# Patient Record
Sex: MALE | Race: WHITE | NOT HISPANIC OR LATINO | Employment: FULL TIME | URBAN - METROPOLITAN AREA
[De-identification: names, ages, dates, MRNs, and addresses within clinical notes are randomized per-mention and may not be internally consistent; named-entity substitution may affect disease eponyms.]

---

## 2017-11-09 ENCOUNTER — HOSPITAL ENCOUNTER (INPATIENT)
Facility: OTHER | Age: 47
LOS: 3 days | Discharge: HOME | DRG: 854 | End: 2017-11-12
Attending: EMERGENCY MEDICINE | Admitting: HOSPITALIST
Payer: MEDICAID

## 2017-11-09 DIAGNOSIS — L02.512 ABSCESS OF LEFT HAND: Primary | ICD-10-CM

## 2017-11-09 DIAGNOSIS — M25.569 KNEE PAIN: ICD-10-CM

## 2017-11-09 DIAGNOSIS — A41.9 SEPSIS: ICD-10-CM

## 2017-11-09 DIAGNOSIS — R01.1 HEART MURMUR: ICD-10-CM

## 2017-11-09 DIAGNOSIS — F19.90 IVDU (INTRAVENOUS DRUG USER): ICD-10-CM

## 2017-11-09 DIAGNOSIS — M13.0 POLYARTHRITIS: ICD-10-CM

## 2017-11-09 DIAGNOSIS — R00.0 TACHYCARDIA: ICD-10-CM

## 2017-11-09 DIAGNOSIS — A41.9 SEPSIS, DUE TO UNSPECIFIED ORGANISM: ICD-10-CM

## 2017-11-09 PROBLEM — M17.11 ARTHRITIS OF RIGHT KNEE: Status: ACTIVE | Noted: 2017-11-09

## 2017-11-09 LAB
ALBUMIN SERPL BCP-MCNC: 3.8 G/DL
ALP SERPL-CCNC: 75 U/L
ALT SERPL W/O P-5'-P-CCNC: 72 U/L
AMPHET+METHAMPHET UR QL: NORMAL
ANION GAP SERPL CALC-SCNC: 14 MMOL/L
AST SERPL-CCNC: 67 U/L
BARBITURATES UR QL SCN>200 NG/ML: NEGATIVE
BASOPHILS # BLD AUTO: 0.03 K/UL
BASOPHILS NFR BLD: 0.2 %
BENZODIAZ UR QL SCN>200 NG/ML: NEGATIVE
BILIRUB SERPL-MCNC: 1.3 MG/DL
BILIRUB UR QL STRIP: NEGATIVE
BNP SERPL-MCNC: <10 PG/ML
BUN SERPL-MCNC: 12 MG/DL
BZE UR QL SCN: NEGATIVE
CALCIUM SERPL-MCNC: 10 MG/DL
CANNABINOIDS UR QL SCN: NEGATIVE
CHLORIDE SERPL-SCNC: 95 MMOL/L
CLARITY UR: CLEAR
CO2 SERPL-SCNC: 21 MMOL/L
COLOR UR: YELLOW
CREAT SERPL-MCNC: 1 MG/DL
CREAT UR-MCNC: 100.7 MG/DL
CRP SERPL-MCNC: 34.5 MG/L
DIFFERENTIAL METHOD: ABNORMAL
EOSINOPHIL # BLD AUTO: 0.1 K/UL
EOSINOPHIL NFR BLD: 0.5 %
ERYTHROCYTE [DISTWIDTH] IN BLOOD BY AUTOMATED COUNT: 12.6 %
ERYTHROCYTE [SEDIMENTATION RATE] IN BLOOD BY WESTERGREN METHOD: 5 MM/HR
EST. GFR  (AFRICAN AMERICAN): >60 ML/MIN/1.73 M^2
EST. GFR  (NON AFRICAN AMERICAN): >60 ML/MIN/1.73 M^2
GLUCOSE SERPL-MCNC: 83 MG/DL
GLUCOSE UR QL STRIP: NEGATIVE
HCT VFR BLD AUTO: 46.8 %
HGB BLD-MCNC: 16.5 G/DL
HGB UR QL STRIP: NEGATIVE
KETONES UR QL STRIP: ABNORMAL
LACTATE SERPL-SCNC: 0.6 MMOL/L
LACTATE SERPL-SCNC: 1.1 MMOL/L
LEUKOCYTE ESTERASE UR QL STRIP: NEGATIVE
LYMPHOCYTES # BLD AUTO: 1 K/UL
LYMPHOCYTES NFR BLD: 6.6 %
MCH RBC QN AUTO: 30.4 PG
MCHC RBC AUTO-ENTMCNC: 35.3 G/DL
MCV RBC AUTO: 86 FL
METHADONE UR QL SCN>300 NG/ML: NEGATIVE
MONOCYTES # BLD AUTO: 1 K/UL
MONOCYTES NFR BLD: 6.9 %
NEUTROPHILS # BLD AUTO: 12.5 K/UL
NEUTROPHILS NFR BLD: 85.5 %
NITRITE UR QL STRIP: NEGATIVE
OPIATES UR QL SCN: NORMAL
PCP UR QL SCN>25 NG/ML: NEGATIVE
PH UR STRIP: 6 [PH] (ref 5–8)
PLATELET # BLD AUTO: 281 K/UL
PMV BLD AUTO: 9.3 FL
POCT GLUCOSE: 78 MG/DL (ref 70–110)
POTASSIUM SERPL-SCNC: 4.3 MMOL/L
PROT SERPL-MCNC: 8.4 G/DL
PROT UR QL STRIP: NEGATIVE
RBC # BLD AUTO: 5.42 M/UL
SODIUM SERPL-SCNC: 130 MMOL/L
SP GR UR STRIP: 1.02 (ref 1–1.03)
TOXICOLOGY INFORMATION: NORMAL
URATE SERPL-MCNC: 7.2 MG/DL
URN SPEC COLLECT METH UR: ABNORMAL
UROBILINOGEN UR STRIP-ACNC: NEGATIVE EU/DL
WBC # BLD AUTO: 14.58 K/UL

## 2017-11-09 PROCEDURE — 86140 C-REACTIVE PROTEIN: CPT

## 2017-11-09 PROCEDURE — 36415 COLL VENOUS BLD VENIPUNCTURE: CPT

## 2017-11-09 PROCEDURE — 83605 ASSAY OF LACTIC ACID: CPT

## 2017-11-09 PROCEDURE — 96365 THER/PROPH/DIAG IV INF INIT: CPT

## 2017-11-09 PROCEDURE — 96375 TX/PRO/DX INJ NEW DRUG ADDON: CPT

## 2017-11-09 PROCEDURE — 93005 ELECTROCARDIOGRAM TRACING: CPT

## 2017-11-09 PROCEDURE — 63600175 PHARM REV CODE 636 W HCPCS: Performed by: HOSPITALIST

## 2017-11-09 PROCEDURE — 25000003 PHARM REV CODE 250: Performed by: PHYSICIAN ASSISTANT

## 2017-11-09 PROCEDURE — 93010 ELECTROCARDIOGRAM REPORT: CPT | Mod: ,,, | Performed by: INTERNAL MEDICINE

## 2017-11-09 PROCEDURE — 81003 URINALYSIS AUTO W/O SCOPE: CPT

## 2017-11-09 PROCEDURE — 85651 RBC SED RATE NONAUTOMATED: CPT

## 2017-11-09 PROCEDURE — 63600175 PHARM REV CODE 636 W HCPCS: Performed by: PHYSICIAN ASSISTANT

## 2017-11-09 PROCEDURE — 87591 N.GONORRHOEAE DNA AMP PROB: CPT

## 2017-11-09 PROCEDURE — 87040 BLOOD CULTURE FOR BACTERIA: CPT

## 2017-11-09 PROCEDURE — 96361 HYDRATE IV INFUSION ADD-ON: CPT

## 2017-11-09 PROCEDURE — 80307 DRUG TEST PRSMV CHEM ANLYZR: CPT

## 2017-11-09 PROCEDURE — 11000001 HC ACUTE MED/SURG PRIVATE ROOM

## 2017-11-09 PROCEDURE — 96366 THER/PROPH/DIAG IV INF ADDON: CPT

## 2017-11-09 PROCEDURE — 99223 1ST HOSP IP/OBS HIGH 75: CPT | Mod: ,,, | Performed by: HOSPITALIST

## 2017-11-09 PROCEDURE — 83880 ASSAY OF NATRIURETIC PEPTIDE: CPT

## 2017-11-09 PROCEDURE — 80053 COMPREHEN METABOLIC PANEL: CPT

## 2017-11-09 PROCEDURE — 84550 ASSAY OF BLOOD/URIC ACID: CPT

## 2017-11-09 PROCEDURE — 99285 EMERGENCY DEPT VISIT HI MDM: CPT | Mod: 25

## 2017-11-09 PROCEDURE — 87086 URINE CULTURE/COLONY COUNT: CPT

## 2017-11-09 PROCEDURE — 82962 GLUCOSE BLOOD TEST: CPT

## 2017-11-09 PROCEDURE — 85025 COMPLETE CBC W/AUTO DIFF WBC: CPT

## 2017-11-09 PROCEDURE — 83605 ASSAY OF LACTIC ACID: CPT | Mod: 91

## 2017-11-09 RX ORDER — MORPHINE SULFATE 2 MG/ML
6 INJECTION, SOLUTION INTRAMUSCULAR; INTRAVENOUS
Status: COMPLETED | OUTPATIENT
Start: 2017-11-09 | End: 2017-11-09

## 2017-11-09 RX ORDER — ZOLPIDEM TARTRATE 5 MG/1
5 TABLET ORAL NIGHTLY PRN
Status: DISCONTINUED | OUTPATIENT
Start: 2017-11-09 | End: 2017-11-12 | Stop reason: HOSPADM

## 2017-11-09 RX ORDER — ENOXAPARIN SODIUM 100 MG/ML
40 INJECTION SUBCUTANEOUS EVERY 24 HOURS
Status: DISCONTINUED | OUTPATIENT
Start: 2017-11-09 | End: 2017-11-12 | Stop reason: HOSPADM

## 2017-11-09 RX ORDER — ACETAMINOPHEN 500 MG
1000 TABLET ORAL
Status: COMPLETED | OUTPATIENT
Start: 2017-11-09 | End: 2017-11-09

## 2017-11-09 RX ORDER — SODIUM CHLORIDE 0.9 % (FLUSH) 0.9 %
5 SYRINGE (ML) INJECTION
Status: DISCONTINUED | OUTPATIENT
Start: 2017-11-09 | End: 2017-11-12 | Stop reason: HOSPADM

## 2017-11-09 RX ADMIN — ACETAMINOPHEN 1000 MG: 500 TABLET ORAL at 02:11

## 2017-11-09 RX ADMIN — SODIUM CHLORIDE 2586 ML: 0.9 INJECTION, SOLUTION INTRAVENOUS at 11:11

## 2017-11-09 RX ADMIN — MORPHINE SULFATE 6 MG: 2 INJECTION, SOLUTION INTRAMUSCULAR; INTRAVENOUS at 12:11

## 2017-11-09 RX ADMIN — ENOXAPARIN SODIUM 40 MG: 100 INJECTION SUBCUTANEOUS at 08:11

## 2017-11-09 RX ADMIN — VANCOMYCIN HYDROCHLORIDE 1000 MG: 1 INJECTION, POWDER, LYOPHILIZED, FOR SOLUTION INTRAVENOUS at 11:11

## 2017-11-09 NOTE — ED TRIAGE NOTES
Pt reports waking up with severe pain to R knee and to L hand. L hand middle joint swelling noted with red streaks up hand. Pt denies any drug use, supplement use, or hx of gout. Pt reports R knee is to painful to bend. Denies any injury/trauma

## 2017-11-09 NOTE — ED NOTES
Urinal at bedside. Pt instructed on need for urine. Pt verbalized understanding. Redness, swelling noted to 4th knuckle left hand with red streaks to left wrist/forearm and warm to touch. Pt reports IV drug use approx 5-6 days ago. Pt reporting 9/10 pain.

## 2017-11-09 NOTE — ED NOTES
Rounding completed on pt. Pt reporting tolerable 7/10 pain, denies any need for pain medication at this time. Bed in lowest, locked position, side rails up x 2. Call light within reach.

## 2017-11-09 NOTE — ED NOTES
Hourly rounding completed on pt. Pt reporting 4/10 pain that only worsening with movement. Pt updated on plan of care. Bed in lowest, locked position, side rails up x 2. Call light within reach.
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
## 2017-11-09 NOTE — ED NOTES
Assumed care of pt from TONYA Vigil. Pt reporting left hand pain with redness and swelling x 2 days, also right knee pain with redness swelling. Pt unsure if bit by something. Pt does admit IV drug use, last used approx 5-6 days ago. Pt awake, alert, oriented and appears in NAD.

## 2017-11-10 ENCOUNTER — ANESTHESIA (OUTPATIENT)
Dept: SURGERY | Facility: OTHER | Age: 47
DRG: 854 | End: 2017-11-10
Payer: MEDICAID

## 2017-11-10 ENCOUNTER — ANESTHESIA EVENT (OUTPATIENT)
Dept: SURGERY | Facility: OTHER | Age: 47
DRG: 854 | End: 2017-11-10
Payer: MEDICAID

## 2017-11-10 PROBLEM — M25.569 KNEE PAIN: Status: ACTIVE | Noted: 2017-11-10

## 2017-11-10 PROBLEM — L02.415 ABSCESS OF RIGHT KNEE: Status: ACTIVE | Noted: 2017-11-09

## 2017-11-10 LAB
ANION GAP SERPL CALC-SCNC: 8 MMOL/L
BACTERIA UR CULT: NO GROWTH
BASOPHILS # BLD AUTO: 0.02 K/UL
BASOPHILS NFR BLD: 0.3 %
BUN SERPL-MCNC: 10 MG/DL
C TRACH DNA SPEC QL NAA+PROBE: NOT DETECTED
CALCIUM SERPL-MCNC: 8.6 MG/DL
CHLORIDE SERPL-SCNC: 101 MMOL/L
CO2 SERPL-SCNC: 25 MMOL/L
CREAT SERPL-MCNC: 0.8 MG/DL
CRP SERPL-MCNC: 60.9 MG/L
DIASTOLIC DYSFUNCTION: YES
DIFFERENTIAL METHOD: ABNORMAL
EOSINOPHIL # BLD AUTO: 0 K/UL
EOSINOPHIL NFR BLD: 0.6 %
ERYTHROCYTE [DISTWIDTH] IN BLOOD BY AUTOMATED COUNT: 12.8 %
ERYTHROCYTE [SEDIMENTATION RATE] IN BLOOD BY WESTERGREN METHOD: 30 MM/HR
EST. GFR  (AFRICAN AMERICAN): >60 ML/MIN/1.73 M^2
EST. GFR  (NON AFRICAN AMERICAN): >60 ML/MIN/1.73 M^2
ESTIMATED PA SYSTOLIC PRESSURE: 14.16
GLOBAL PERICARDIAL EFFUSION: ABNORMAL
GLUCOSE SERPL-MCNC: 103 MG/DL
HCT VFR BLD AUTO: 42.7 %
HGB BLD-MCNC: 14.7 G/DL
LACTATE SERPL-SCNC: 0.7 MMOL/L
LYMPHOCYTES # BLD AUTO: 0.7 K/UL
LYMPHOCYTES NFR BLD: 10.9 %
MAGNESIUM SERPL-MCNC: 2.2 MG/DL
MCH RBC QN AUTO: 30.2 PG
MCHC RBC AUTO-ENTMCNC: 34.4 G/DL
MCV RBC AUTO: 88 FL
MITRAL VALVE REGURGITATION: ABNORMAL
MONOCYTES # BLD AUTO: 1.5 K/UL
MONOCYTES NFR BLD: 22.7 %
N GONORRHOEA DNA SPEC QL NAA+PROBE: NOT DETECTED
NEUTROPHILS # BLD AUTO: 4.2 K/UL
NEUTROPHILS NFR BLD: 64.9 %
PHOSPHATE SERPL-MCNC: 2.4 MG/DL
PLATELET # BLD AUTO: 231 K/UL
PMV BLD AUTO: 9.3 FL
POTASSIUM SERPL-SCNC: 3.8 MMOL/L
RBC # BLD AUTO: 4.86 M/UL
RETIRED EF AND QEF - SEE NOTES: 65 (ref 55–65)
SODIUM SERPL-SCNC: 134 MMOL/L
TRICUSPID VALVE REGURGITATION: ABNORMAL
WBC # BLD AUTO: 6.42 K/UL

## 2017-11-10 PROCEDURE — 83735 ASSAY OF MAGNESIUM: CPT

## 2017-11-10 PROCEDURE — 99233 SBSQ HOSP IP/OBS HIGH 50: CPT | Mod: ,,, | Performed by: INTERNAL MEDICINE

## 2017-11-10 PROCEDURE — 25000003 PHARM REV CODE 250: Performed by: ANESTHESIOLOGY

## 2017-11-10 PROCEDURE — 25000003 PHARM REV CODE 250: Performed by: NURSE ANESTHETIST, CERTIFIED REGISTERED

## 2017-11-10 PROCEDURE — 99233 SBSQ HOSP IP/OBS HIGH 50: CPT | Mod: ,,, | Performed by: HOSPITALIST

## 2017-11-10 PROCEDURE — 63600175 PHARM REV CODE 636 W HCPCS: Performed by: ANESTHESIOLOGY

## 2017-11-10 PROCEDURE — 83605 ASSAY OF LACTIC ACID: CPT

## 2017-11-10 PROCEDURE — 99233 SBSQ HOSP IP/OBS HIGH 50: CPT | Mod: 57,25,, | Performed by: PLASTIC SURGERY

## 2017-11-10 PROCEDURE — 84100 ASSAY OF PHOSPHORUS: CPT

## 2017-11-10 PROCEDURE — 63600175 PHARM REV CODE 636 W HCPCS: Performed by: INTERNAL MEDICINE

## 2017-11-10 PROCEDURE — 85025 COMPLETE CBC W/AUTO DIFF WBC: CPT

## 2017-11-10 PROCEDURE — 80048 BASIC METABOLIC PNL TOTAL CA: CPT

## 2017-11-10 PROCEDURE — 93306 TTE W/DOPPLER COMPLETE: CPT

## 2017-11-10 PROCEDURE — 37000008 HC ANESTHESIA 1ST 15 MINUTES: Performed by: PLASTIC SURGERY

## 2017-11-10 PROCEDURE — 25000003 PHARM REV CODE 250: Performed by: HOSPITALIST

## 2017-11-10 PROCEDURE — 25000003 PHARM REV CODE 250: Performed by: PLASTIC SURGERY

## 2017-11-10 PROCEDURE — 94761 N-INVAS EAR/PLS OXIMETRY MLT: CPT

## 2017-11-10 PROCEDURE — 37000009 HC ANESTHESIA EA ADD 15 MINS: Performed by: PLASTIC SURGERY

## 2017-11-10 PROCEDURE — 63600175 PHARM REV CODE 636 W HCPCS: Performed by: NURSE ANESTHETIST, CERTIFIED REGISTERED

## 2017-11-10 PROCEDURE — 26460 INCISE HAND/FINGER TENDON: CPT | Mod: LT,,, | Performed by: PLASTIC SURGERY

## 2017-11-10 PROCEDURE — 87205 SMEAR GRAM STAIN: CPT

## 2017-11-10 PROCEDURE — 63600175 PHARM REV CODE 636 W HCPCS: Performed by: HOSPITALIST

## 2017-11-10 PROCEDURE — 36415 COLL VENOUS BLD VENIPUNCTURE: CPT

## 2017-11-10 PROCEDURE — 36000705 HC OR TIME LEV I EA ADD 15 MIN: Performed by: PLASTIC SURGERY

## 2017-11-10 PROCEDURE — 36000704 HC OR TIME LEV I 1ST 15 MIN: Performed by: PLASTIC SURGERY

## 2017-11-10 PROCEDURE — S0028 INJECTION, FAMOTIDINE, 20 MG: HCPCS | Performed by: NURSE ANESTHETIST, CERTIFIED REGISTERED

## 2017-11-10 PROCEDURE — 25000003 PHARM REV CODE 250: Performed by: NURSE PRACTITIONER

## 2017-11-10 PROCEDURE — 87075 CULTR BACTERIA EXCEPT BLOOD: CPT

## 2017-11-10 PROCEDURE — 11000001 HC ACUTE MED/SURG PRIVATE ROOM

## 2017-11-10 PROCEDURE — 85651 RBC SED RATE NONAUTOMATED: CPT

## 2017-11-10 PROCEDURE — 0LB80ZZ EXCISION OF LEFT HAND TENDON, OPEN APPROACH: ICD-10-PCS | Performed by: PLASTIC SURGERY

## 2017-11-10 PROCEDURE — 86140 C-REACTIVE PROTEIN: CPT

## 2017-11-10 PROCEDURE — 87070 CULTURE OTHR SPECIMN AEROBIC: CPT

## 2017-11-10 PROCEDURE — 26010 DRAINAGE OF FINGER ABSCESS: CPT | Mod: 51,F8,, | Performed by: PLASTIC SURGERY

## 2017-11-10 PROCEDURE — 71000033 HC RECOVERY, INTIAL HOUR: Performed by: PLASTIC SURGERY

## 2017-11-10 RX ORDER — DEXAMETHASONE SODIUM PHOSPHATE 4 MG/ML
INJECTION, SOLUTION INTRA-ARTICULAR; INTRALESIONAL; INTRAMUSCULAR; INTRAVENOUS; SOFT TISSUE
Status: DISCONTINUED | OUTPATIENT
Start: 2017-11-10 | End: 2017-11-10

## 2017-11-10 RX ORDER — SODIUM CHLORIDE 0.9 % (FLUSH) 0.9 %
3 SYRINGE (ML) INJECTION
Status: DISCONTINUED | OUTPATIENT
Start: 2017-11-10 | End: 2017-11-12 | Stop reason: HOSPADM

## 2017-11-10 RX ORDER — FENTANYL CITRATE 50 UG/ML
INJECTION, SOLUTION INTRAMUSCULAR; INTRAVENOUS
Status: DISCONTINUED | OUTPATIENT
Start: 2017-11-10 | End: 2017-11-10

## 2017-11-10 RX ORDER — LIDOCAINE HCL/PF 100 MG/5ML
SYRINGE (ML) INTRAVENOUS
Status: DISCONTINUED | OUTPATIENT
Start: 2017-11-10 | End: 2017-11-10

## 2017-11-10 RX ORDER — HYDROMORPHONE HYDROCHLORIDE 2 MG/ML
0.4 INJECTION, SOLUTION INTRAMUSCULAR; INTRAVENOUS; SUBCUTANEOUS EVERY 5 MIN PRN
Status: DISCONTINUED | OUTPATIENT
Start: 2017-11-10 | End: 2017-11-10 | Stop reason: HOSPADM

## 2017-11-10 RX ORDER — ONDANSETRON 2 MG/ML
INJECTION INTRAMUSCULAR; INTRAVENOUS
Status: DISCONTINUED | OUTPATIENT
Start: 2017-11-10 | End: 2017-11-10

## 2017-11-10 RX ORDER — MEPERIDINE HYDROCHLORIDE 50 MG/ML
12.5 INJECTION INTRAMUSCULAR; INTRAVENOUS; SUBCUTANEOUS ONCE AS NEEDED
Status: DISCONTINUED | OUTPATIENT
Start: 2017-11-10 | End: 2017-11-10 | Stop reason: HOSPADM

## 2017-11-10 RX ORDER — FENTANYL CITRATE 50 UG/ML
25 INJECTION, SOLUTION INTRAMUSCULAR; INTRAVENOUS EVERY 5 MIN PRN
Status: DISCONTINUED | OUTPATIENT
Start: 2017-11-10 | End: 2017-11-10 | Stop reason: HOSPADM

## 2017-11-10 RX ORDER — FENTANYL CITRATE 50 UG/ML
25 INJECTION, SOLUTION INTRAMUSCULAR; INTRAVENOUS EVERY 5 MIN PRN
Status: DISCONTINUED | OUTPATIENT
Start: 2017-11-10 | End: 2017-11-10 | Stop reason: SDUPTHER

## 2017-11-10 RX ORDER — ONDANSETRON 2 MG/ML
4 INJECTION INTRAMUSCULAR; INTRAVENOUS DAILY PRN
Status: DISCONTINUED | OUTPATIENT
Start: 2017-11-10 | End: 2017-11-10 | Stop reason: HOSPADM

## 2017-11-10 RX ORDER — BACITRACIN ZINC 500 UNIT/G
OINTMENT (GRAM) TOPICAL
Status: DISCONTINUED | OUTPATIENT
Start: 2017-11-10 | End: 2017-11-10 | Stop reason: HOSPADM

## 2017-11-10 RX ORDER — OXYCODONE HYDROCHLORIDE 5 MG/1
5 TABLET ORAL
Status: DISCONTINUED | OUTPATIENT
Start: 2017-11-10 | End: 2017-11-10 | Stop reason: SDUPTHER

## 2017-11-10 RX ORDER — FAMOTIDINE 10 MG/ML
INJECTION INTRAVENOUS
Status: COMPLETED
Start: 2017-11-10 | End: 2017-11-10

## 2017-11-10 RX ORDER — OXYCODONE AND ACETAMINOPHEN 5; 325 MG/1; MG/1
1 TABLET ORAL ONCE
Status: COMPLETED | OUTPATIENT
Start: 2017-11-10 | End: 2017-11-10

## 2017-11-10 RX ORDER — MEPERIDINE HYDROCHLORIDE 50 MG/ML
12.5 INJECTION INTRAMUSCULAR; INTRAVENOUS; SUBCUTANEOUS ONCE AS NEEDED
Status: DISCONTINUED | OUTPATIENT
Start: 2017-11-10 | End: 2017-11-10 | Stop reason: SDUPTHER

## 2017-11-10 RX ORDER — BACITRACIN 50000 [IU]/1
INJECTION, POWDER, FOR SOLUTION INTRAMUSCULAR
Status: DISCONTINUED | OUTPATIENT
Start: 2017-11-10 | End: 2017-11-10 | Stop reason: HOSPADM

## 2017-11-10 RX ORDER — SODIUM CHLORIDE, SODIUM LACTATE, POTASSIUM CHLORIDE, CALCIUM CHLORIDE 600; 310; 30; 20 MG/100ML; MG/100ML; MG/100ML; MG/100ML
INJECTION, SOLUTION INTRAVENOUS CONTINUOUS PRN
Status: DISCONTINUED | OUTPATIENT
Start: 2017-11-10 | End: 2017-11-10

## 2017-11-10 RX ORDER — OXYCODONE HYDROCHLORIDE 5 MG/1
5 TABLET ORAL
Status: DISCONTINUED | OUTPATIENT
Start: 2017-11-10 | End: 2017-11-10 | Stop reason: HOSPADM

## 2017-11-10 RX ORDER — OXYCODONE AND ACETAMINOPHEN 5; 325 MG/1; MG/1
1 TABLET ORAL EVERY 6 HOURS PRN
Status: DISCONTINUED | OUTPATIENT
Start: 2017-11-10 | End: 2017-11-12 | Stop reason: HOSPADM

## 2017-11-10 RX ORDER — ACETAMINOPHEN 325 MG/1
650 TABLET ORAL EVERY 6 HOURS PRN
Status: DISCONTINUED | OUTPATIENT
Start: 2017-11-10 | End: 2017-11-12 | Stop reason: HOSPADM

## 2017-11-10 RX ORDER — ONDANSETRON 2 MG/ML
4 INJECTION INTRAMUSCULAR; INTRAVENOUS DAILY PRN
Status: DISCONTINUED | OUTPATIENT
Start: 2017-11-10 | End: 2017-11-10 | Stop reason: SDUPTHER

## 2017-11-10 RX ORDER — GLYCOPYRROLATE 0.2 MG/ML
INJECTION INTRAMUSCULAR; INTRAVENOUS
Status: DISCONTINUED | OUTPATIENT
Start: 2017-11-10 | End: 2017-11-10

## 2017-11-10 RX ORDER — FAMOTIDINE 10 MG/ML
INJECTION INTRAVENOUS
Status: DISCONTINUED | OUTPATIENT
Start: 2017-11-10 | End: 2017-11-10

## 2017-11-10 RX ORDER — INDOMETHACIN 25 MG/1
25 CAPSULE ORAL
Status: DISCONTINUED | OUTPATIENT
Start: 2017-11-10 | End: 2017-11-12 | Stop reason: HOSPADM

## 2017-11-10 RX ORDER — METOCLOPRAMIDE HYDROCHLORIDE 5 MG/ML
INJECTION INTRAMUSCULAR; INTRAVENOUS
Status: DISCONTINUED | OUTPATIENT
Start: 2017-11-10 | End: 2017-11-10

## 2017-11-10 RX ORDER — PROPOFOL 10 MG/ML
VIAL (ML) INTRAVENOUS
Status: DISCONTINUED | OUTPATIENT
Start: 2017-11-10 | End: 2017-11-10

## 2017-11-10 RX ORDER — HYDROMORPHONE HYDROCHLORIDE 2 MG/ML
0.4 INJECTION, SOLUTION INTRAMUSCULAR; INTRAVENOUS; SUBCUTANEOUS EVERY 5 MIN PRN
Status: DISCONTINUED | OUTPATIENT
Start: 2017-11-10 | End: 2017-11-10 | Stop reason: SDUPTHER

## 2017-11-10 RX ADMIN — OXYCODONE HYDROCHLORIDE AND ACETAMINOPHEN 1 TABLET: 5; 325 TABLET ORAL at 02:11

## 2017-11-10 RX ADMIN — FENTANYL CITRATE 100 MCG: 50 INJECTION, SOLUTION INTRAMUSCULAR; INTRAVENOUS at 04:11

## 2017-11-10 RX ADMIN — PROPOFOL 200 MG: 10 INJECTION, EMULSION INTRAVENOUS at 04:11

## 2017-11-10 RX ADMIN — VANCOMYCIN HYDROCHLORIDE 1250 MG: 1 INJECTION, POWDER, LYOPHILIZED, FOR SOLUTION INTRAVENOUS at 12:11

## 2017-11-10 RX ADMIN — ONDANSETRON 4 MG: 2 INJECTION INTRAMUSCULAR; INTRAVENOUS at 04:11

## 2017-11-10 RX ADMIN — DEXAMETHASONE SODIUM PHOSPHATE 8 MG: 4 INJECTION, SOLUTION INTRAMUSCULAR; INTRAVENOUS at 04:11

## 2017-11-10 RX ADMIN — OXYCODONE HYDROCHLORIDE 5 MG: 5 TABLET ORAL at 05:11

## 2017-11-10 RX ADMIN — ACETAMINOPHEN 650 MG: 325 TABLET ORAL at 12:11

## 2017-11-10 RX ADMIN — VANCOMYCIN HYDROCHLORIDE 1250 MG: 1 INJECTION, POWDER, LYOPHILIZED, FOR SOLUTION INTRAVENOUS at 10:11

## 2017-11-10 RX ADMIN — FENTANYL CITRATE 25 MCG: 50 INJECTION INTRAMUSCULAR; INTRAVENOUS at 05:11

## 2017-11-10 RX ADMIN — SODIUM CHLORIDE, SODIUM LACTATE, POTASSIUM CHLORIDE, AND CALCIUM CHLORIDE: 600; 310; 30; 20 INJECTION, SOLUTION INTRAVENOUS at 03:11

## 2017-11-10 RX ADMIN — PROPOFOL 100 MG: 10 INJECTION, EMULSION INTRAVENOUS at 04:11

## 2017-11-10 RX ADMIN — VANCOMYCIN HYDROCHLORIDE 1250 MG: 1 INJECTION, POWDER, LYOPHILIZED, FOR SOLUTION INTRAVENOUS at 11:11

## 2017-11-10 RX ADMIN — GLYCOPYRROLATE 0.4 MG: 0.2 INJECTION, SOLUTION INTRAMUSCULAR; INTRAVENOUS at 04:11

## 2017-11-10 RX ADMIN — FAMOTIDINE 20 MG: 10 INJECTION, SOLUTION INTRAVENOUS at 04:11

## 2017-11-10 RX ADMIN — METOCLOPRAMIDE 20 MG: 5 INJECTION, SOLUTION INTRAMUSCULAR; INTRAVENOUS at 04:11

## 2017-11-10 RX ADMIN — CEFTRIAXONE 2 G: 2 INJECTION, SOLUTION INTRAVENOUS at 10:11

## 2017-11-10 RX ADMIN — LIDOCAINE HYDROCHLORIDE 50 MG: 20 INJECTION, SOLUTION INTRAVENOUS at 04:11
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
## 2017-11-10 NOTE — ASSESSMENT & PLAN NOTE
- Meets SIRS criteria with fever, elevated WBC and evidence of infection in left hand and right knee.  - Started IV vancomycin; consult ID to evaluate  - Not currently sexually active  - Likely will need drainage of abscess left hand; consult hand surgeon   - Check uric acid level to rule out gout  - Right knee does not have obvious abscess, so will wait for further workup before consulting orthopedic surgeon  - Patient with heart murmur - 2D echo to evaluate  - Blood cultures pending

## 2017-11-10 NOTE — ANESTHESIA PREPROCEDURE EVALUATION
11/10/2017  Sterling Gtz is a 47 y.o., male.    Anesthesia Evaluation    I have reviewed the Patient Summary Reports.    I have reviewed the Nursing Notes.   I have reviewed the Medications.     Review of Systems  Anesthesia Hx:  No problems with previous Anesthesia  Denies Family Hx of Anesthesia complications.   Denies Personal Hx of Anesthesia complications.   Social:  Non-Smoker    Hematology/Oncology:  Hematology Normal   Oncology Normal     EENT/Dental:EENT/Dental Normal   Cardiovascular:  Cardiovascular Normal     Pulmonary:  Pulmonary Normal    Renal/:  Renal/ Normal     Hepatic/GI:  Hepatic/GI Normal    Musculoskeletal:  Musculoskeletal Normal    Neurological:  Neurology Normal    Endocrine:  Endocrine Normal    Dermatological:  Skin Normal    Psych:  Psychiatric Normal           Physical Exam  General:  Well nourished    Airway/Jaw/Neck:  Airway Findings: Mouth Opening: Normal Mallampati: I      Dental:  Dental Findings: In tact             Anesthesia Plan  Type of Anesthesia, risks & benefits discussed:  Anesthesia Type:  general  Patient's Preference:   Intra-op Monitoring Plan: standard ASA monitors  Intra-op Monitoring Plan Comments:   Post Op Pain Control Plan:   Post Op Pain Control Plan Comments:   Induction:   IV  Beta Blocker:         Informed Consent: Patient understands risks and agrees with Anesthesia plan.  Questions answered. Anesthesia consent signed with patient.  ASA Score: 1  emergent   Day of Surgery Review of History & Physical:    H&P update referred to the surgeon.         Ready For Surgery From Anesthesia Perspective.
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
## 2017-11-10 NOTE — SUBJECTIVE & OBJECTIVE
Interval History:  Hand swelling is more diffuse.  Still exquisitely tender.  Knee painful and swollen as well.  He has spiked temps to 102.8.  Seen by hand surgeon and I&D planned for later today.    Review of Systems  Objective:     Vital Signs (Most Recent):  Temp: 98.3 °F (36.8 °C) (11/10/17 0754)  Pulse: 62 (11/10/17 0754)  Resp: 18 (11/10/17 0754)  BP: 124/69 (11/10/17 0754)  SpO2: 100 % (11/10/17 0754) Vital Signs (24h Range):  Temp:  [98.3 °F (36.8 °C)-102.8 °F (39.3 °C)] 98.3 °F (36.8 °C)  Pulse:  [60-88] 62  Resp:  [15-20] 18  SpO2:  [95 %-100 %] 100 %  BP: (124-148)/(63-72) 124/69     Weight: 91.8 kg (202 lb 6.1 oz)  Body mass index is 28.62 kg/m².    Intake/Output Summary (Last 24 hours) at 11/10/17 1541  Last data filed at 11/10/17 0700   Gross per 24 hour   Intake              490 ml   Output                0 ml   Net              490 ml      Physical Exam   Constitutional: He is oriented to person, place, and time. He appears well-developed and well-nourished. He appears distressed.   HENT:   Head: Normocephalic.   Eyes: Conjunctivae are normal. Pupils are equal, round, and reactive to light.   Neck: Neck supple. No thyromegaly present.   Cardiovascular: Normal rate, regular rhythm and intact distal pulses.  Exam reveals no gallop and no friction rub.    Murmur heard.  Pulmonary/Chest: Effort normal and breath sounds normal.   Abdominal: Soft. Bowel sounds are normal. He exhibits no distension. There is no tenderness.   Musculoskeletal:   Left hand with swelling, erythema and exquisite tenderness over 2nd, 3rd and 4th MCP joints.  Right knee swollen, red and tender.   Lymphadenopathy:     He has no cervical adenopathy.   Neurological: He is alert and oriented to person, place, and time.   Myoclonus not present today.   Skin: Skin is warm and dry. No rash noted.   Psychiatric: He has a normal mood and affect. His behavior is normal. Thought content normal.       Significant Labs:   CBC:   Recent  Labs  Lab 11/09/17  1018 11/10/17  0427   WBC 14.58* 6.42   HGB 16.5 14.7   HCT 46.8 42.7    231       Significant Imaging: I have reviewed all pertinent imaging results/findings within the past 24 hours.

## 2017-11-10 NOTE — BRIEF OP NOTE
Operative Note       Surgery Date: 11/10/2017     Surgeon(s) and Role:     * Pablito Garcia Jr., MD - Primary    Pre-op Diagnosis:  Abscess of left hand [L02.512]    Post-op Diagnosis:  Extensor tendon tenosynovitis of left hand and left ring finger paronychial abscess    Procedure(s) (LRB):  INCISION AND DRAINAGE (I&D) hand (Left)   Simple drainage of left ring finger paronychial abscess    Anesthesia: General    Findings/Key Components:  Non-supporative tenosynovitis of the extensor tendons over the left dorsal hand with mild turbid fluid present, abscess of left ring finger paronychia    Core Measure Documentation:  Were antibiotics extended? yes  Was the patient administered a VTE Prophylaxis? No. Short procedure; low risk  Estimated Blood Loss: minimal           Specimens     Left hand fluid and left hand extensor tendon synovium for cultures        Implants: 1/4 inch penrose drain    Complications: none           Disposition: PACU - hemodynamically stable.           Condition: Stable    Attestation:  I was present for the entire procedure.

## 2017-11-10 NOTE — ASSESSMENT & PLAN NOTE
Patient with swollen, red left hand and right knee. Patient used IV heroin and cocaine a week ago - has broken left molar, systolic murmur on exam. Patient has pain with movement of left fingers, movement of right knee. Blood cultures pending.      Recommend 2D echo today, MRI of left hand - may have tenosynovitis from left ring finger infection. Also consider aspiration of right knee.      Continue Vancomycin and add ceftriaxone for possible gram negative organisms, possible gonorrhea.
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
## 2017-11-10 NOTE — ASSESSMENT & PLAN NOTE
- Meets SIRS criteria with fever, elevated HR, elevated WBC and evidence of infection in left hand and right knee.  - Started IV vancomycin and added ceftriaxone to cover gram negatives today  - Hand surgeon to do I&D in OR today, hopefully in conjunction with orthopedic drainage of right knee.  - Patient with heart murmur but 2D echo did not show evidence of endocarditis  - Blood cultures show no growth so far.

## 2017-11-10 NOTE — HOSPITAL COURSE
Patient was started on IV antibiotics and ID and hand surgery were consulted.  He underwent I&D of his left hand and ring finger on 11/10 with Dr. Garcia.  Seen also by the orthopedic surgeon, who diagnosed prepatellar bursitis and did not find enough fluid in the knee to drain.  Subsequently the knee swelling resolved completely.    At discharge the aerobic cultures of fluid drained from the hand showed no growth, and gram stain showed no organisms.  The anaerobic cultures were pending at discharge.  He was discharged on a course of clindamycin and with wound care instructions.  Follow up in hand clinic in 2 weeks for suture removal.

## 2017-11-10 NOTE — PLAN OF CARE
CM met with pt at bedside for initial discharge planning assessment. Pt states he lives alone and is independent. Dispo pending ID recs and tests. CM to follow.     11/10/17 0913   Discharge Assessment   Assessment Type Discharge Planning Assessment   Confirmed/corrected address and phone number on facesheet? Yes   Assessment information obtained from? Patient   Expected Length of Stay (days) 2   Communicated expected length of stay with patient/caregiver yes   Prior to hospitilization cognitive status: Alert/Oriented   Prior to hospitalization functional status: Independent   Current cognitive status: Alert/Oriented   Current Functional Status: Independent   Facility Arrived From: home   Lives With alone   Able to Return to Prior Arrangements yes   Is patient able to care for self after discharge? Yes   Patient's perception of discharge disposition home or selfcare   Readmission Within The Last 30 Days no previous admission in last 30 days   Patient currently being followed by outpatient case management? No   Patient currently receives any other outside agency services? No   Equipment Currently Used at Home none   Do you have any problems affording any of your prescribed medications? TBD   Is the patient taking medications as prescribed? yes   Does the patient have transportation home? Yes   Transportation Available family or friend will provide   Does the patient receive services at the [facility]? No   Discharge Plan A Home   Discharge Plan B Home   Patient/Family In Agreement With Plan yes

## 2017-11-10 NOTE — PLAN OF CARE
Problem: Patient Care Overview  Goal: Plan of Care Review  Outcome: Ongoing (interventions implemented as appropriate)  Pt noted with one febrile episode this shift. Currently afebrile. Voiced no needs at this time. Will continue with plan of care.

## 2017-11-10 NOTE — NURSING
PINA Kelley notified of pt temp 102.8. Order for Tylenol received. Will continue with plan of care.

## 2017-11-10 NOTE — ASSESSMENT & PLAN NOTE
Patient with swollen, red left hand and right knee. Patient used IV heroin and cocaine a week ago - has broken left molar, systolic murmur on exam. Patient has pain with movement of left fingers, movement of right knee. Blood cultures pending.      Recommend 2D echo today, MRI of left hand - may have tenosynovitis from left ring finger infection.   Continue vancomycin. Add ceftriaxone for possible gram negative organisms, possible gonorrhea.
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
## 2017-11-10 NOTE — HPI
47 year old man who woke up yesterday with his left hand and right knee swollen, red and painful. He states that he had fever and chills starting yesterday. He states that he last used IV cocaine and heroin in his left AC fossa a week ago. He uses clean needles and syringes, but not sterile ones. He has some trauma to his hands from his work. He does not remember any trauma to his knee. He has no dysuria or any penile discharge. 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

## 2017-11-10 NOTE — TRANSFER OF CARE
"Anesthesia Transfer of Care Note    Patient: Sterling Gtz    Procedure(s) Performed: Procedure(s) (LRB):  INCISION AND DRAINAGE (I&D) hand (Left)    Patient location: PACU    Anesthesia Type: general    Transport from OR: Transported from OR on 2-3 L/min O2 by NC with adequate spontaneous ventilation    Post pain: adequate analgesia    Post assessment: no apparent anesthetic complications    Post vital signs: stable    Level of consciousness: awake    Nausea/Vomiting: no nausea/vomiting    Complications: none    Transfer of care protocol was followed      Last vitals:   Visit Vitals  /69 (BP Location: Right arm, Patient Position: Lying)   Pulse 62   Temp 36.8 °C (98.3 °F) (Oral)   Resp 18   Ht 5' 10.51" (1.791 m)   Wt 91.8 kg (202 lb 6.1 oz)   SpO2 100%   BMI 28.62 kg/m²     "

## 2017-11-10 NOTE — ASSESSMENT & PLAN NOTE
- Reports occasional use, last time about a week ago  - 2D echo showed mild diastolic dysfunction with normal EF and no valve abnormalities

## 2017-11-11 PROBLEM — L02.415 ABSCESS OF RIGHT KNEE: Status: RESOLVED | Noted: 2017-11-09 | Resolved: 2017-11-11

## 2017-11-11 LAB
ANION GAP SERPL CALC-SCNC: 8 MMOL/L
BASOPHILS # BLD AUTO: 0.01 K/UL
BASOPHILS NFR BLD: 0.1 %
BUN SERPL-MCNC: 10 MG/DL
CALCIUM SERPL-MCNC: 9.1 MG/DL
CHLORIDE SERPL-SCNC: 103 MMOL/L
CO2 SERPL-SCNC: 27 MMOL/L
CREAT SERPL-MCNC: 0.8 MG/DL
DIFFERENTIAL METHOD: ABNORMAL
EOSINOPHIL # BLD AUTO: 0 K/UL
EOSINOPHIL NFR BLD: 0 %
ERYTHROCYTE [DISTWIDTH] IN BLOOD BY AUTOMATED COUNT: 12.5 %
EST. GFR  (AFRICAN AMERICAN): >60 ML/MIN/1.73 M^2
EST. GFR  (NON AFRICAN AMERICAN): >60 ML/MIN/1.73 M^2
GLUCOSE SERPL-MCNC: 184 MG/DL
GRAM STN SPEC: NORMAL
HCT VFR BLD AUTO: 42.7 %
HGB BLD-MCNC: 14.3 G/DL
LYMPHOCYTES # BLD AUTO: 0.5 K/UL
LYMPHOCYTES NFR BLD: 6.8 %
MCH RBC QN AUTO: 29.5 PG
MCHC RBC AUTO-ENTMCNC: 33.5 G/DL
MCV RBC AUTO: 88 FL
MONOCYTES # BLD AUTO: 0.5 K/UL
MONOCYTES NFR BLD: 6.4 %
NEUTROPHILS # BLD AUTO: 6.6 K/UL
NEUTROPHILS NFR BLD: 86.6 %
PLATELET # BLD AUTO: 258 K/UL
PMV BLD AUTO: 9.3 FL
POTASSIUM SERPL-SCNC: 4.5 MMOL/L
RBC # BLD AUTO: 4.84 M/UL
SODIUM SERPL-SCNC: 138 MMOL/L
VANCOMYCIN TROUGH SERPL-MCNC: 6.9 UG/ML
WBC # BLD AUTO: 7.64 K/UL

## 2017-11-11 PROCEDURE — 25000003 PHARM REV CODE 250: Performed by: PLASTIC SURGERY

## 2017-11-11 PROCEDURE — 85025 COMPLETE CBC W/AUTO DIFF WBC: CPT

## 2017-11-11 PROCEDURE — 36415 COLL VENOUS BLD VENIPUNCTURE: CPT

## 2017-11-11 PROCEDURE — 25000003 PHARM REV CODE 250: Performed by: HOSPITALIST

## 2017-11-11 PROCEDURE — 99233 SBSQ HOSP IP/OBS HIGH 50: CPT | Mod: ,,, | Performed by: HOSPITALIST

## 2017-11-11 PROCEDURE — 80202 ASSAY OF VANCOMYCIN: CPT

## 2017-11-11 PROCEDURE — 11000001 HC ACUTE MED/SURG PRIVATE ROOM

## 2017-11-11 PROCEDURE — 94761 N-INVAS EAR/PLS OXIMETRY MLT: CPT

## 2017-11-11 PROCEDURE — 63600175 PHARM REV CODE 636 W HCPCS: Performed by: INTERNAL MEDICINE

## 2017-11-11 PROCEDURE — 63600175 PHARM REV CODE 636 W HCPCS: Performed by: HOSPITALIST

## 2017-11-11 PROCEDURE — 99900035 HC TECH TIME PER 15 MIN (STAT)

## 2017-11-11 PROCEDURE — 80048 BASIC METABOLIC PNL TOTAL CA: CPT

## 2017-11-11 RX ORDER — BACITRACIN 500 [USP'U]/G
OINTMENT TOPICAL 3 TIMES DAILY
Status: DISCONTINUED | OUTPATIENT
Start: 2017-11-11 | End: 2017-11-12 | Stop reason: HOSPADM

## 2017-11-11 RX ADMIN — INDOMETHACIN 25 MG: 25 CAPSULE ORAL at 08:11

## 2017-11-11 RX ADMIN — VANCOMYCIN HYDROCHLORIDE 1750 MG: 1 INJECTION, POWDER, LYOPHILIZED, FOR SOLUTION INTRAVENOUS at 11:11

## 2017-11-11 RX ADMIN — ENOXAPARIN SODIUM 40 MG: 100 INJECTION SUBCUTANEOUS at 05:11

## 2017-11-11 RX ADMIN — OXYCODONE HYDROCHLORIDE AND ACETAMINOPHEN 1 TABLET: 5; 325 TABLET ORAL at 07:11

## 2017-11-11 RX ADMIN — CEFTRIAXONE 2 G: 2 INJECTION, SOLUTION INTRAVENOUS at 11:11

## 2017-11-11 RX ADMIN — VANCOMYCIN HYDROCHLORIDE 1250 MG: 1 INJECTION, POWDER, LYOPHILIZED, FOR SOLUTION INTRAVENOUS at 12:11

## 2017-11-11 RX ADMIN — INDOMETHACIN 25 MG: 25 CAPSULE ORAL at 12:11

## 2017-11-11 RX ADMIN — INDOMETHACIN 25 MG: 25 CAPSULE ORAL at 05:11

## 2017-11-11 RX ADMIN — BACITRACIN: 500 OINTMENT TOPICAL at 09:11

## 2017-11-11 NOTE — ANESTHESIA POSTPROCEDURE EVALUATION
"Anesthesia Post Evaluation    Patient: Sterling Gtz    Procedure(s) Performed: Procedure(s) (LRB):  INCISION AND DRAINAGE (I&D) hand (Left)    Final Anesthesia Type: general  Patient location during evaluation: PACU  Patient participation: Yes- Able to Participate  Level of consciousness: awake and alert  Post-procedure vital signs: reviewed and stable  Pain management: adequate  Airway patency: patent  PONV status at discharge: No PONV  Anesthetic complications: no      Cardiovascular status: hemodynamically stable  Respiratory status: unassisted and spontaneous ventilation  Hydration status: euvolemic  Follow-up not needed.        Visit Vitals  BP (!) 155/96   Pulse 70   Temp 37.1 °C (98.7 °F) (Oral)   Resp 16   Ht 5' 10.51" (1.791 m)   Wt 91.8 kg (202 lb 6.1 oz)   SpO2 100%   BMI 28.62 kg/m²       Pain/Olga Score: Pain Assessment Performed: Yes (11/10/2017  5:54 PM)  Presence of Pain: complains of pain/discomfort (11/10/2017  5:54 PM)  Pain Rating Prior to Med Admin: 5 (11/10/2017  5:54 PM)  Pain Rating Post Med Admin: 5 (11/10/2017  5:53 PM)  Olga Score: 10 (11/10/2017  5:18 PM)      "

## 2017-11-11 NOTE — SUBJECTIVE & OBJECTIVE
Interval History:  POD #1.  He is soaking the hand.  Pain vastly improved and the knee swelling has decreased on its own, now has full range of motion.    Review of Systems   Constitutional: Negative for chills and fever.   Respiratory: Negative for cough and shortness of breath.    Cardiovascular: Negative for chest pain and palpitations.     Objective:     Vital Signs (Most Recent):  Temp: 98.2 °F (36.8 °C) (11/11/17 1355)  Pulse: (!) 58 (11/11/17 1355)  Resp: 18 (11/11/17 1355)  BP: (!) 169/88 (11/11/17 1355)  SpO2: 99 % (11/11/17 1355) Vital Signs (24h Range):  Temp:  [97.6 °F (36.4 °C)-98.8 °F (37.1 °C)] 98.2 °F (36.8 °C)  Pulse:  [54-75] 58  Resp:  [16-18] 18  SpO2:  [95 %-100 %] 99 %  BP: (139-169)/(73-99) 169/88     Weight: 91.8 kg (202 lb 6.1 oz)  Body mass index is 28.62 kg/m².    Intake/Output Summary (Last 24 hours) at 11/11/17 1528  Last data filed at 11/11/17 0645   Gross per 24 hour   Intake             1730 ml   Output              500 ml   Net             1230 ml      Physical Exam   Constitutional: He is oriented to person, place, and time. He appears well-developed and well-nourished. He appears distressed.   HENT:   Head: Normocephalic.   Eyes: Conjunctivae are normal. Pupils are equal, round, and reactive to light.   Neck: Neck supple. No thyromegaly present.   Cardiovascular: Normal rate, regular rhythm and intact distal pulses.  Exam reveals no gallop and no friction rub.    Murmur heard.  Pulmonary/Chest: Effort normal and breath sounds normal.   Abdominal: Soft. Bowel sounds are normal. He exhibits no distension. There is no tenderness.   Musculoskeletal:   Left hand with surgical wound/sutures, soaking in a basin.  Right knee swelling resolving.   Lymphadenopathy:     He has no cervical adenopathy.   Neurological: He is alert and oriented to person, place, and time.   Skin: Skin is warm and dry. No rash noted.   Psychiatric: He has a normal mood and affect. His behavior is normal. Thought  content normal.       Significant Labs:   CBC:   Recent Labs  Lab 11/10/17  0427 11/11/17  0432   WBC 6.42 7.64   HGB 14.7 14.3   HCT 42.7 42.7    258       Significant Imaging: I have reviewed all pertinent imaging results/findings within the past 24 hours.

## 2017-11-11 NOTE — PLAN OF CARE
Problem: Patient Care Overview  Goal: Plan of Care Review  Outcome: Ongoing (interventions implemented as appropriate)  Patient lying quietly in bed with eyes closed. No distress or discomfort noted at this time. Encouraged patient to call for any and all needs. Patient verbalized understanding of instructions. Will continue to monitor patient.

## 2017-11-11 NOTE — CONSULTS
DATE OF CONSULTATION:  11/10/2017.    CHIEF COMPLAINT:  Right knee pain and swelling.    HISTORY OF PRESENT ILLNESS:  This is a 47-year-old male who is admitted   yesterday with pain, swelling and redness in his left hand and right knee.  He   does not remember any previous trauma.  He does have a history of IV drug abuse.    He presented with low-grade fever and increased white blood cell count.    Consultation was obtained for the right knee.    PAST MEDICAL HISTORY:  Significant for IV drug abuse with previous appendectomy,   scaphoid fracture surgery and abscess drainage of the right elbow.    ALLERGIES:  He has no known drug allergies.    PHYSICAL EXAMINATION:  On exam of the right knee, he is noted to have early   prepatellar bursitis.  There is some mild erythema and tenderness to palpation.    The knee joint has no evidence of an effusion and no tenderness to palpation   along the joint lines.  There appears to be no involvement of the knee joint.    There is also no accumulation of fluid in the prepatellar bursa for aspiration.    IMPRESSION:  Prepatellar bursitis, right knee.    PLAN:  At this time, I would agree with the vancomycin and ceftriaxone.  No   surgical intervention is needed for the knee at this time.  I will follow him   while he is in the hospital.      KAYODE  dd: 11/10/2017 14:02:26 (CST)  td: 11/10/2017 22:08:34 (CST)  Doc ID   #[id number]  Job ID #[id number]    CC:

## 2017-11-11 NOTE — PLAN OF CARE
Problem: Patient Care Overview  Goal: Plan of Care Review  Outcome: Ongoing (interventions implemented as appropriate)  Patient on room air.  No changes at this time.  Will continue to monitor.

## 2017-11-11 NOTE — PROGRESS NOTES
[facility]  Plastic Surgery  Progress Note    Subjective:     Interval History: pt pod #1 s/p I&D left hand and left ring finger, doing well, pain is minimal, not taking pain medications.  No fevers/chills    Post-Op Info:  Procedure(s) (LRB):  INCISION AND DRAINAGE (I&D) hand (Left)   1 Day Post-Op      Medications:  Continuous Infusions:   Scheduled Meds:   cefTRIAXone (ROCEPHIN) IVPB  2 g Intravenous Q24H    enoxaparin  40 mg Subcutaneous Daily    indomethacin  25 mg Oral TID WM    vancomycin (VANCOCIN) IVPB  15 mg/kg Intravenous Q12H     PRN Meds:acetaminophen, oxyCODONE-acetaminophen, sodium chloride 0.9%, sodium chloride 0.9%, sodium chloride 0.9%, zolpidem     Objective:     Vital Signs (Most Recent):  Temp: 98.2 °F (36.8 °C) (11/11/17 1355)  Pulse: (!) 58 (11/11/17 1355)  Resp: 18 (11/11/17 1355)  BP: (!) 169/88 (11/11/17 1355)  SpO2: 99 % (11/11/17 1355) Vital Signs (24h Range):  Temp:  [97.6 °F (36.4 °C)-98.8 °F (37.1 °C)] 98.2 °F (36.8 °C)  Pulse:  [54-75] 58  Resp:  [16-18] 18  SpO2:  [95 %-100 %] 99 %  BP: (139-169)/(73-99) 169/88       Intake/Output Summary (Last 24 hours) at 11/11/17 1456  Last data filed at 11/11/17 0645   Gross per 24 hour   Intake             1730 ml   Output              500 ml   Net             1230 ml       Physical Exam   Constitutional: No distress.   Musculoskeletal:   Left hand: dressings removed, improving erythema and swelling, no lyudmila pus noted, limite ROM 2/2 pain   Skin: Skin is warm and dry. He is not diaphoretic.       Significant Labs:  All pertinent labs from the last 24 hours have been reviewed.    Significant Diagnostics:  none    Assessment/Plan:     Active Diagnoses:    Diagnosis Date Noted POA    PRINCIPAL PROBLEM:  Sepsis [A41.9] 11/09/2017 Yes    Knee pain [M25.569] 11/10/2017 Yes    Abscess of left hand [L02.512] 11/09/2017 Yes    Abscess of right knee [L02.415] 11/09/2017 Yes    IVDU (intravenous drug user) [F19.90] 11/09/2017  Yes      Problems Resolved During this Admission:    Diagnosis Date Noted Date Resolved POA     - removed penrose drain today at bedside, hand soaked in warm soapy water, then dressed.  - continue antibiotics per ID recs  - continue TID warm soapy soaks to left hand and dress with bacitracin and dry dressings  - f/u in hand clinic in two weeks for suture removal.     Pablito Garcia Jr, MD  Plastic Surgery  [facility]

## 2017-11-11 NOTE — ASSESSMENT & PLAN NOTE
- Met SIRS criteria for sepsis on admission with fever, elevated HR, elevated WBC and evidence of infection in left hand and right knee.  - Increase IV vancomycin given low trough and continue ceftriaxone to cover gram negatives while awaiting cultures.  - POD #1 I&D left hand.  - Knee swelling resolving on its own.  - Patient with heart murmur but 2D echo did not show evidence of endocarditis  - Blood cultures show no growth so far.
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
## 2017-11-12 VITALS
OXYGEN SATURATION: 98 % | SYSTOLIC BLOOD PRESSURE: 152 MMHG | BODY MASS INDEX: 28.33 KG/M2 | TEMPERATURE: 98 F | WEIGHT: 202.38 LBS | DIASTOLIC BLOOD PRESSURE: 84 MMHG | HEIGHT: 71 IN | HEART RATE: 50 BPM | RESPIRATION RATE: 18 BRPM

## 2017-11-12 PROBLEM — M70.41 PREPATELLAR BURSITIS OF RIGHT KNEE: Status: ACTIVE | Noted: 2017-11-12

## 2017-11-12 LAB
ANION GAP SERPL CALC-SCNC: 4 MMOL/L
BASOPHILS # BLD AUTO: 0.03 K/UL
BASOPHILS NFR BLD: 0.5 %
BUN SERPL-MCNC: 8 MG/DL
CALCIUM SERPL-MCNC: 8.9 MG/DL
CHLORIDE SERPL-SCNC: 107 MMOL/L
CO2 SERPL-SCNC: 27 MMOL/L
CREAT SERPL-MCNC: 0.7 MG/DL
DIFFERENTIAL METHOD: ABNORMAL
EOSINOPHIL # BLD AUTO: 0 K/UL
EOSINOPHIL NFR BLD: 0.7 %
ERYTHROCYTE [DISTWIDTH] IN BLOOD BY AUTOMATED COUNT: 12.6 %
EST. GFR  (AFRICAN AMERICAN): >60 ML/MIN/1.73 M^2
EST. GFR  (NON AFRICAN AMERICAN): >60 ML/MIN/1.73 M^2
GLUCOSE SERPL-MCNC: 111 MG/DL
HCT VFR BLD AUTO: 40.7 %
HGB BLD-MCNC: 13.8 G/DL
LYMPHOCYTES # BLD AUTO: 0.8 K/UL
LYMPHOCYTES NFR BLD: 14 %
MCH RBC QN AUTO: 29.9 PG
MCHC RBC AUTO-ENTMCNC: 33.9 G/DL
MCV RBC AUTO: 88 FL
MONOCYTES # BLD AUTO: 0.7 K/UL
MONOCYTES NFR BLD: 11.9 %
NEUTROPHILS # BLD AUTO: 4.4 K/UL
NEUTROPHILS NFR BLD: 72.7 %
PLATELET # BLD AUTO: 246 K/UL
PMV BLD AUTO: 9.3 FL
POTASSIUM SERPL-SCNC: 3.8 MMOL/L
RBC # BLD AUTO: 4.62 M/UL
SODIUM SERPL-SCNC: 138 MMOL/L
WBC # BLD AUTO: 5.99 K/UL

## 2017-11-12 PROCEDURE — 85025 COMPLETE CBC W/AUTO DIFF WBC: CPT

## 2017-11-12 PROCEDURE — 25000003 PHARM REV CODE 250: Performed by: HOSPITALIST

## 2017-11-12 PROCEDURE — 80048 BASIC METABOLIC PNL TOTAL CA: CPT

## 2017-11-12 PROCEDURE — 63600175 PHARM REV CODE 636 W HCPCS: Performed by: INTERNAL MEDICINE

## 2017-11-12 PROCEDURE — 63600175 PHARM REV CODE 636 W HCPCS: Performed by: HOSPITALIST

## 2017-11-12 PROCEDURE — 36415 COLL VENOUS BLD VENIPUNCTURE: CPT

## 2017-11-12 PROCEDURE — 99900035 HC TECH TIME PER 15 MIN (STAT)

## 2017-11-12 PROCEDURE — 99238 HOSP IP/OBS DSCHRG MGMT 30/<: CPT | Mod: ,,, | Performed by: HOSPITALIST

## 2017-11-12 PROCEDURE — 94761 N-INVAS EAR/PLS OXIMETRY MLT: CPT

## 2017-11-12 RX ORDER — BACITRACIN 500 [USP'U]/G
OINTMENT TOPICAL 3 TIMES DAILY
Refills: 0 | COMMUNITY
Start: 2017-11-12 | End: 2017-11-29 | Stop reason: ALTCHOICE

## 2017-11-12 RX ORDER — CLINDAMYCIN HYDROCHLORIDE 150 MG/1
450 CAPSULE ORAL 3 TIMES DAILY
Qty: 63 CAPSULE | Refills: 0 | Status: SHIPPED | OUTPATIENT
Start: 2017-11-12 | End: 2017-11-19

## 2017-11-12 RX ADMIN — BACITRACIN: 500 OINTMENT TOPICAL at 06:11

## 2017-11-12 RX ADMIN — OXYCODONE HYDROCHLORIDE AND ACETAMINOPHEN 1 TABLET: 5; 325 TABLET ORAL at 02:11

## 2017-11-12 RX ADMIN — BACITRACIN: 500 OINTMENT TOPICAL at 02:11

## 2017-11-12 RX ADMIN — OXYCODONE HYDROCHLORIDE AND ACETAMINOPHEN 1 TABLET: 5; 325 TABLET ORAL at 10:11

## 2017-11-12 RX ADMIN — INDOMETHACIN 25 MG: 25 CAPSULE ORAL at 12:11

## 2017-11-12 RX ADMIN — INDOMETHACIN 25 MG: 25 CAPSULE ORAL at 08:11

## 2017-11-12 RX ADMIN — VANCOMYCIN HYDROCHLORIDE 1750 MG: 1 INJECTION, POWDER, LYOPHILIZED, FOR SOLUTION INTRAVENOUS at 12:11

## 2017-11-12 RX ADMIN — CEFTRIAXONE 2 G: 2 INJECTION, SOLUTION INTRAVENOUS at 10:11

## 2017-11-12 NOTE — PLAN OF CARE
Problem: Patient Care Overview  Goal: Plan of Care Review  Outcome: Ongoing (interventions implemented as appropriate)  Patient lying quietly in bed with television on and eyes closed. No distress or discomfort noted. Patient has received an oxycodone 5/325 mg oral tablet twice during this shift per his request for pain management. Pain has been tolerable thus far during shift. Wound card done to left hand as ordered. Received evening dose of vancomycin 1750 mg in 500 mL piggyback. Encouraged patient to call for any and all needs. Patient verbalized understanding of instructions. Will continue to monitor patient.

## 2017-11-12 NOTE — PROGRESS NOTES
Discharge papers and instructions given. All questions answered and Pt verbalized understanding. IV removed w/o complication. Pt walked out under own power.

## 2017-11-12 NOTE — PROGRESS NOTES
"Progress Note  Orthopedics    Admit Date: 11/9/2017   Patient ID: Sterling Gtz is a 47 y.o. male.  Had L hand I&D.  R knee has a resolving pre-patellar bursitis.  Much improved with Abs.  Will sign off.            Alfred Kaiser      Vital Sign (recent):  BP (!) 149/81 (BP Location: Right arm, Patient Position: Lying)   Pulse (!) 50   Temp 98.1 °F (36.7 °C) (Oral)   Resp 18   Ht 5' 10.51" (1.791 m)   Wt 91.8 kg (202 lb 6.1 oz)   SpO2 96%   BMI 28.62 kg/m²       Laboratory:    CBC:   Recent Labs  Lab 11/12/17  0446   WBC 5.99   RBC 4.62   HGB 13.8*   HCT 40.7      MCV 88   MCH 29.9   MCHC 33.9       CMP:   Recent Labs  Lab 11/09/17  1018  11/12/17  0446   GLU 83  < > 111*   CALCIUM 10.0  < > 8.9   ALBUMIN 3.8  --   --    PROT 8.4  --   --    *  < > 138   K 4.3  < > 3.8   CO2 21*  < > 27   CL 95  < > 107   BUN 12  < > 8   CREATININE 1.0  < > 0.7   ALKPHOS 75  --   --    ALT 72*  --   --    AST 67*  --   --    BILITOT 1.3*  --   --    < > = values in this interval not displayed.        Intake/Output Summary (Last 24 hours) at 11/12/17 0755  Last data filed at 11/12/17 0630   Gross per 24 hour   Intake              980 ml   Output              375 ml   Net              605 ml         Current Medications:   bacitracin   Topical (Top) TID    cefTRIAXone (ROCEPHIN) IVPB  2 g Intravenous Q24H    enoxaparin  40 mg Subcutaneous Daily    indomethacin  25 mg Oral TID WM    vancomycin (VANCOCIN) IVPB  20 mg/kg Intravenous Q12H       Continuous Infusions:   PRN Meds:.acetaminophen, oxyCODONE-acetaminophen, sodium chloride 0.9%, sodium chloride 0.9%, sodium chloride 0.9%, zolpidem  "
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
## 2017-11-12 NOTE — OP NOTE
DATE OF PROCEDURE:  11/10/2017.    PREOPERATIVE DIAGNOSIS:  Left hand infection and cellulitis.    POSTOPERATIVE DIAGNOSES:    1.  Left hand extensor tendon tenosynovitis.  2.  Left ring finger paronychial abscess.    PROCEDURES:  Incision and drainage of left hand infection.  Simple drainage of   right ring finger paronychial abscess.    SURGEON:  Pablito Garcia Jr., M.D.    ANESTHESIA:  General via LMA.    FLUIDS:  300 mL crystalloid.    ESTIMATED BLOOD LOSS:  Minimal.    SPECIMENS:  Left hand fluid and left hand extensor tendon synovium for cultures.    IMPLANTS AND GRAFTS:   Quarter-inch Penrose drain.    FINDINGS:  There was nonsuppurative tenosynovitis of the extensor tendons over   the dorsal left hand with mild turbid fluid present and an abscess of the left   ring finger paronychia.    COMPLICATIONS:  None.    DISPOSITION:  To PACU, then home.    INDICATIONS:  Mr. Gtz is a 47-year-old right-hand dominant male who was   admitted to the hospital for right knee and left hand cellulitis.  The patient   had a history of intravenous drug use.  He was found to be septic.  On exam, he   was noted to have erythema and swelling over the dorsal aspect of his left hand,   particularly involving the third, fourth and fifth fingers and ultrasound   showed no definitive fluid collection; however, based on symptoms.  I discussed   performing an I and D with washout and obtaining cultures and possible tissue   samples due to the presenting infection.  After discussing the risks, benefits   and alternatives with the patient in detail he wished to proceed.  Informed   consent was obtained and the patient was scheduled for procedure.    PROCEDURE IN DETAIL:  After proper identification of Mr. Gtz in the   preoperative area, the patient was wheeled to the Operating Room on hospital   stretcher.  Pressure points padded and checked this time.  A timeout was called   in when surgeon, nurses and Anesthesia agreed upon the  patient and procedure.    He was then induced under general anesthesia via LMA.  Once the patient was   asleep, a padded 18-inch tourniquet was then placed to his left upper extremity,   his upper extremity was prepped and draped in usual sterile fashion.  Then, the   upper extremity was elevated.  Pressure was held over the brachial artery for   approximately 1 minute and the tourniquet was inflated to 250 mmHg.  Next, a   longitudinal incision was created over the left fourth metacarpal.  This was   carried down through skin, dermis, subcutaneous tissue, mild turbid fluid was   found within the subcutaneous space.  There was also inflamed tenosynovium and   the tenosynovium over the extensor tendon to the ring finger was then sharply   excised and submitted for culture fluid from the dorsal aspect of the hand was   also submitted for culture was sent to Pathology for speciation.  I did not find   any pockets of pus; however, there was some mild turbid fluid.  Next, the wound   was then irrigated with copious amounts of normal saline with 150,000   bacitracin units using  irrigation.  I then turned my attention to the distal   aspect of the ring finger.  There was swelling of the paronychia with a small   wound, apply an external pressure to the paronychia and I was able to express   lyudmila pus.  This was then evacuated completely in the area of eschar was then   removed using forceps.    I then inspected beneath the eponychial fold for any additional areas of   purulence.  There were none found.  Next, the wound was then closed loosely and   a Penrose drain was placed within the hand.  The wound was then cleaned and   dried, the tourniquet was then released.  The wound was dressed with bacitracin   ointment, followed by Xeroform and a volar splint with the wrist in neutral and   digits 3 through 5.  He mobilized with the MCP joints in flexion 60 degrees and   the IP joints in 0 degrees of extension.  This  concluded the procedure.  The   patient tolerated the procedure well without any complications.  At the end of   the case, needle and sponge counts were correct x2, and I was present and   scrubbed during all aspects of procedure.      MIREYA/IN  dd: 11/11/2017 15:06:29 (CST)  td: 11/11/2017 19:21:21 (CST)  Doc ID   #[id number]  Job ID #[id number]    CC:

## 2017-11-13 NOTE — PLAN OF CARE
Pt discharged home 11/12/17. No CM needs for discharge, pt to follow-up with [facility]. No further CM needs for discharge.     11/13/17 1032   Final Note   Assessment Type Final Discharge Note   Discharge Disposition Home   What phone number can be called within the next 1-3 days to see how you are doing after discharge? [phone]   Hospital Follow Up  Appt(s) scheduled? Yes   Discharge plans and expectations educations in teach back method with documentation complete? Yes

## 2017-11-14 LAB
BACTERIA BLD CULT: NORMAL
BACTERIA BLD CULT: NORMAL
BACTERIA SPEC AEROBE CULT: NO GROWTH
BACTERIA SPEC AEROBE CULT: NO GROWTH

## 2017-11-20 LAB
BACTERIA SPEC ANAEROBE CULT: NORMAL
BACTERIA SPEC ANAEROBE CULT: NORMAL

## 2017-11-28 ENCOUNTER — TELEPHONE (OUTPATIENT)
Dept: ORTHOPEDICS | Facility: CLINIC | Age: 47
End: 2017-11-28

## 2017-11-28 NOTE — TELEPHONE ENCOUNTER
----- Message from Niki Santos sent at 11/28/2017  1:17 PM CST -----  Contact: NYA ELLIOTT [[id number]]  x_  1st Request  _  2nd Request  _  3rd Request        Who: NYA ELLIOTT [[id number]]    Why: Requesting a call back in regards to ER follow up appointment to have his sutures removed. Please call him.     What Number to Call Back:  [phone]     When to Expect a call back: (Within 24 hours)    Please return the call at earliest convenience. Thanks!

## 2017-11-29 ENCOUNTER — OFFICE VISIT (OUTPATIENT)
Dept: ORTHOPEDICS | Facility: CLINIC | Age: 47
End: 2017-11-29
Payer: MEDICAID

## 2017-11-29 VITALS
HEIGHT: 71 IN | SYSTOLIC BLOOD PRESSURE: 149 MMHG | BODY MASS INDEX: 30.19 KG/M2 | HEART RATE: 59 BPM | DIASTOLIC BLOOD PRESSURE: 84 MMHG | WEIGHT: 215.63 LBS

## 2017-11-29 DIAGNOSIS — Z98.890 STATUS POST SURGERY: Primary | ICD-10-CM

## 2017-11-29 PROCEDURE — 99024 POSTOP FOLLOW-UP VISIT: CPT | Mod: ,,, | Performed by: PLASTIC SURGERY

## 2017-11-29 PROCEDURE — 99213 OFFICE O/P EST LOW 20 MIN: CPT | Mod: PBBFAC | Performed by: PLASTIC SURGERY

## 2017-11-29 PROCEDURE — 99999 PR PBB SHADOW E&M-EST. PATIENT-LVL III: CPT | Mod: PBBFAC,,, | Performed by: PLASTIC SURGERY

## 2017-11-29 NOTE — PROGRESS NOTES
"Mr. Gtz is here today for a post-operative visit.he is 14 days status post I&D left hand . he reports that he is doing well, and back to work.  Pain is non-existent.  he is not taking pain medication . he denies fever, chills, and sweats since the time of the surgery.     Physical exam:    Vitals:    11/29/17 0758   BP: (!) 149/84   Pulse: (!) 59   Weight: 97.8 kg (215 lb 9.8 oz)   Height: 5' 10.5" (1.791 m)   PainSc: 0-No pain   PainLoc: Hand     Sutures in place Incision is clean, dry and intact.  There is no erythema or exudate mild edema.  There is no sign of any infection. he is NVI.     Assessment:14days status post I&D of left hand    Plan:  Sterling was seen today for post-op evaluation.    Diagnoses and all orders for this visit:    Status post surgery        -sutures removed in clinic  - unknown source for infection discussed that he should return to ED if he has any recurrent symptoms  - Continue Range of motion exercises   - Tylenol 500 mg and/or Ibuprofen 400mg every 4-6 hours with food for pain as tolerated  - - Follow up: prn    "

## 2018-09-15 ENCOUNTER — HOSPITAL ENCOUNTER (EMERGENCY)
Facility: OTHER | Age: 48
Discharge: HOME | End: 2018-09-15
Attending: EMERGENCY MEDICINE
Payer: MEDICAID

## 2018-09-15 VITALS
HEART RATE: 50 BPM | DIASTOLIC BLOOD PRESSURE: 90 MMHG | RESPIRATION RATE: 18 BRPM | TEMPERATURE: 98 F | OXYGEN SATURATION: 100 % | BODY MASS INDEX: 27.2 KG/M2 | SYSTOLIC BLOOD PRESSURE: 168 MMHG | HEIGHT: 70 IN | WEIGHT: 190 LBS

## 2018-09-15 DIAGNOSIS — R11.2 NAUSEA AND VOMITING, INTRACTABILITY OF VOMITING NOT SPECIFIED, UNSPECIFIED VOMITING TYPE: ICD-10-CM

## 2018-09-15 DIAGNOSIS — S39.012A LUMBAR STRAIN, INITIAL ENCOUNTER: ICD-10-CM

## 2018-09-15 DIAGNOSIS — S16.1XXA CERVICAL STRAIN, ACUTE, INITIAL ENCOUNTER: Primary | ICD-10-CM
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
## 2018-09-15 PROCEDURE — 25000003 PHARM REV CODE 250: Performed by: EMERGENCY MEDICINE

## 2018-09-15 PROCEDURE — 99284 EMERGENCY DEPT VISIT MOD MDM: CPT | Mod: 25

## 2018-09-15 RX ORDER — ONDANSETRON 4 MG/1
4 TABLET, ORALLY DISINTEGRATING ORAL EVERY 8 HOURS PRN
Qty: 12 TABLET | Refills: 0 | Status: SHIPPED | OUTPATIENT
Start: 2018-09-15

## 2018-09-15 RX ORDER — ONDANSETRON 4 MG/1
4 TABLET, ORALLY DISINTEGRATING ORAL
Status: COMPLETED | OUTPATIENT
Start: 2018-09-15 | End: 2018-09-15

## 2018-09-15 RX ORDER — METHOCARBAMOL 500 MG/1
1000 TABLET, FILM COATED ORAL 3 TIMES DAILY PRN
Qty: 30 TABLET | Refills: 0 | OUTPATIENT
Start: 2018-09-15 | End: 2018-09-20

## 2018-09-15 RX ORDER — IBUPROFEN 600 MG/1
600 TABLET ORAL
Status: COMPLETED | OUTPATIENT
Start: 2018-09-15 | End: 2018-09-15

## 2018-09-15 RX ORDER — IBUPROFEN 600 MG/1
600 TABLET ORAL EVERY 6 HOURS PRN
Qty: 20 TABLET | Refills: 0 | OUTPATIENT
Start: 2018-09-15 | End: 2018-09-20

## 2018-09-15 RX ADMIN — IBUPROFEN 600 MG: 600 TABLET ORAL at 03:09

## 2018-09-15 RX ADMIN — ONDANSETRON 4 MG: 4 TABLET, ORALLY DISINTEGRATING ORAL at 02:09
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
## 2018-09-20 ENCOUNTER — HOSPITAL ENCOUNTER (EMERGENCY)
Facility: OTHER | Age: 48
Discharge: HOME | End: 2018-09-20
Attending: EMERGENCY MEDICINE
Payer: MEDICAID

## 2018-09-20 VITALS
HEIGHT: 70 IN | DIASTOLIC BLOOD PRESSURE: 97 MMHG | WEIGHT: 190 LBS | RESPIRATION RATE: 18 BRPM | HEART RATE: 78 BPM | OXYGEN SATURATION: 98 % | BODY MASS INDEX: 27.2 KG/M2 | TEMPERATURE: 99 F | SYSTOLIC BLOOD PRESSURE: 144 MMHG

## 2018-09-20 DIAGNOSIS — M54.50 ACUTE RIGHT-SIDED LOW BACK PAIN WITHOUT SCIATICA: Primary | ICD-10-CM

## 2018-09-20 PROCEDURE — 25000003 PHARM REV CODE 250: Performed by: PHYSICIAN ASSISTANT

## 2018-09-20 PROCEDURE — 63600175 PHARM REV CODE 636 W HCPCS: Performed by: PHYSICIAN ASSISTANT

## 2018-09-20 PROCEDURE — 96372 THER/PROPH/DIAG INJ SC/IM: CPT

## 2018-09-20 PROCEDURE — 99283 EMERGENCY DEPT VISIT LOW MDM: CPT | Mod: 25

## 2018-09-20 RX ORDER — KETOROLAC TROMETHAMINE 30 MG/ML
30 INJECTION, SOLUTION INTRAMUSCULAR; INTRAVENOUS
Status: COMPLETED | OUTPATIENT
Start: 2018-09-20 | End: 2018-09-20

## 2018-09-20 RX ORDER — CYCLOBENZAPRINE HCL 10 MG
10 TABLET ORAL 3 TIMES DAILY PRN
Qty: 15 TABLET | Refills: 0 | Status: SHIPPED | OUTPATIENT
Start: 2018-09-20 | End: 2018-09-25

## 2018-09-20 RX ORDER — OXAPROZIN 600 MG/1
600 TABLET, FILM COATED ORAL 2 TIMES DAILY PRN
Qty: 20 TABLET | Refills: 0 | Status: SHIPPED | OUTPATIENT
Start: 2018-09-20

## 2018-09-20 RX ORDER — DEXAMETHASONE SODIUM PHOSPHATE 4 MG/ML
8 INJECTION, SOLUTION INTRA-ARTICULAR; INTRALESIONAL; INTRAMUSCULAR; INTRAVENOUS; SOFT TISSUE
Status: COMPLETED | OUTPATIENT
Start: 2018-09-20 | End: 2018-09-20

## 2018-09-20 RX ORDER — CYCLOBENZAPRINE HCL 10 MG
10 TABLET ORAL
Status: COMPLETED | OUTPATIENT
Start: 2018-09-20 | End: 2018-09-20

## 2018-09-20 RX ADMIN — KETOROLAC TROMETHAMINE 30 MG: 30 INJECTION, SOLUTION INTRAMUSCULAR at 03:09

## 2018-09-20 RX ADMIN — CYCLOBENZAPRINE HYDROCHLORIDE 10 MG: 10 TABLET, FILM COATED ORAL at 03:09

## 2018-09-20 RX ADMIN — DEXAMETHASONE SODIUM PHOSPHATE 8 MG: 4 INJECTION, SOLUTION INTRAMUSCULAR; INTRAVENOUS at 03:09
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
## 2019-01-07 ENCOUNTER — HOSPITAL ENCOUNTER (EMERGENCY)
Facility: OTHER | Age: 49
Discharge: HOME | End: 2019-01-07
Attending: EMERGENCY MEDICINE
Payer: COMMERCIAL

## 2019-01-07 VITALS
HEART RATE: 57 BPM | DIASTOLIC BLOOD PRESSURE: 72 MMHG | TEMPERATURE: 98 F | BODY MASS INDEX: 27.2 KG/M2 | SYSTOLIC BLOOD PRESSURE: 138 MMHG | RESPIRATION RATE: 18 BRPM | OXYGEN SATURATION: 97 % | WEIGHT: 190 LBS | HEIGHT: 70 IN

## 2019-01-07 DIAGNOSIS — T14.8XXA TRAUMATIC MYALGIA: Primary | ICD-10-CM

## 2019-01-07 DIAGNOSIS — Z04.1 ENCOUNTER FOR EXAMINATION FOLLOWING MOTOR VEHICLE COLLISION (MVC): ICD-10-CM

## 2019-01-07 PROCEDURE — 63600175 PHARM REV CODE 636 W HCPCS: Performed by: EMERGENCY MEDICINE

## 2019-01-07 PROCEDURE — 99284 EMERGENCY DEPT VISIT MOD MDM: CPT | Mod: 25

## 2019-01-07 PROCEDURE — 25000003 PHARM REV CODE 250: Performed by: EMERGENCY MEDICINE

## 2019-01-07 PROCEDURE — 96372 THER/PROPH/DIAG INJ SC/IM: CPT

## 2019-01-07 RX ORDER — ORPHENADRINE CITRATE 30 MG/ML
60 INJECTION INTRAMUSCULAR; INTRAVENOUS
Status: COMPLETED | OUTPATIENT
Start: 2019-01-07 | End: 2019-01-07

## 2019-01-07 RX ORDER — NAPROXEN 500 MG/1
500 TABLET ORAL
Status: COMPLETED | OUTPATIENT
Start: 2019-01-07 | End: 2019-01-07

## 2019-01-07 RX ORDER — NAPROXEN 500 MG/1
500 TABLET ORAL 2 TIMES DAILY WITH MEALS
Qty: 20 TABLET | Refills: 0 | Status: SHIPPED | OUTPATIENT
Start: 2019-01-07 | End: 2019-01-17

## 2019-01-07 RX ORDER — CYCLOBENZAPRINE HCL 10 MG
10 TABLET ORAL 3 TIMES DAILY PRN
Qty: 15 TABLET | Refills: 0 | Status: SHIPPED | OUTPATIENT
Start: 2019-01-07 | End: 2019-01-12

## 2019-01-07 RX ADMIN — ORPHENADRINE CITRATE 60 MG: 60 INJECTION INTRAMUSCULAR; INTRAVENOUS at 10:01

## 2019-01-07 RX ADMIN — NAPROXEN 500 MG: 500 TABLET ORAL at 10:01
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
## 2022-06-10 NOTE — ASSESSMENT & PLAN NOTE
- Reports occasional use, last time about a week ago  - 2D echo to evaluate for endocarditis     Erythromycin Pregnancy And Lactation Text: This medication is Pregnancy Category B and is considered safe during pregnancy. It is also excreted in breast milk.
